# Patient Record
Sex: MALE | Race: WHITE | NOT HISPANIC OR LATINO | Employment: OTHER | URBAN - METROPOLITAN AREA
[De-identification: names, ages, dates, MRNs, and addresses within clinical notes are randomized per-mention and may not be internally consistent; named-entity substitution may affect disease eponyms.]

---

## 2020-08-08 ENCOUNTER — HOSPITAL ENCOUNTER (EMERGENCY)
Facility: CLINIC | Age: 55
Discharge: HOME | End: 2020-08-08
Attending: EMERGENCY MEDICINE | Admitting: EMERGENCY MEDICINE

## 2020-08-08 VITALS
OXYGEN SATURATION: 100 % | RESPIRATION RATE: 6 BRPM | TEMPERATURE: 98.2 F | DIASTOLIC BLOOD PRESSURE: 110 MMHG | SYSTOLIC BLOOD PRESSURE: 178 MMHG

## 2020-08-08 DIAGNOSIS — S00.512A ABRASION OF GUM, INITIAL ENCOUNTER: ICD-10-CM

## 2020-08-08 DIAGNOSIS — T18.0XXA FOREIGN BODY IN MOUTH, INITIAL ENCOUNTER: ICD-10-CM

## 2020-08-08 PROCEDURE — 99283 EMERGENCY DEPT VISIT LOW MDM: CPT | Mod: 25

## 2020-08-08 NOTE — ED PROVIDER NOTES
History     Chief Complaint:  Gum discomfort    HPI   Gm Reis is a 55 year old male who presents with gum discomfort. The patient reports that he was eating and he thinks that a fish bone may have gotten stuck in his gum. This happened 15 minutes before his arrival. He states that it started bleeding heavily and it hurts when he talks.    Allergies:  No Known Allergies    Medications:    The patient is not currently taking any prescribed medications.    Past Medical History:    Asthma  Eye problems  Social stressor  Erectile dysfunction  Acid reflux  Glaucoma    Past Surgical History:    Cyst incision and drainage  Laser eye surgery    Family History:    Father- alcohol/drug  Mother- hypertension     Social History:  PCP: Provider Not In System  Presents to the ED by himself    Review of Systems   HENT: Positive for dental problem.    All other systems reviewed and are negative.        Physical Exam     Patient Vitals for the past 24 hrs:   BP Temp Temp src Heart Rate Resp SpO2   08/08/20 1845 (!) 178/110 98.2  F (36.8  C) Oral 79 (!) 6 100 %       Physical Exam  Constitutional:       Appearance: He is well-developed.   HENT:      Right Ear: External ear normal.      Left Ear: External ear normal.      Mouth/Throat:      Pharynx: No oropharyngeal exudate or posterior oropharyngeal erythema.      Comments: Left lower gum with mild oozing. I see a small fish bone under the left base of the tongue. This was easily removable.  Eyes:      General: No scleral icterus.     Conjunctiva/sclera: Conjunctivae normal.      Pupils: Pupils are equal, round, and reactive to light.   Cardiovascular:      Rate and Rhythm: Normal rate and regular rhythm.      Heart sounds: Normal heart sounds. No murmur. No friction rub. No gallop.    Pulmonary:      Effort: Pulmonary effort is normal. No respiratory distress.      Breath sounds: Normal breath sounds. No wheezing or rales.   Neurological:      Mental Status: He is alert.        Emergency Department Course       Procedures:    PROCEDURE: Removal of foreign body from the mouth  CONSENT: Verbal  INDICATION:  Imbedded foreign body in the mouth, presumed to be a fish bone  POST-PROCEDURE DIAGNOSIS: successful removal of  foreign body from behind the tongue.   PROVIDER: Obed Vázquez MD  DESCRIPTION OF PROCEDURE:    Informed verbal consent. Site was correctly identified. The foreign body was identified in the base of the tongue and removed with a tongue depressor.  The foreign body was successfully removed.    Emergency Department Course:  Past medical records, nursing notes, and vitals reviewed.  1900: I performed an exam of the patient and obtained history, as documented above.    Findings and plan explained to the Patient. Patient discharged home with instructions regarding supportive care, medications, and reasons to return. The importance of close follow-up was reviewed.     Impression & Plan      Medical Decision Making:  Patient presents after feeling like a piece of fish bone got stuck on his gum. He could not get it removed and there was bleeding. When I examined his mouth there appears to be an object under his left tongue in between the cheek and the base of the tongue. I was able to remove that easily with a tongue depressor and the entire piece came out and he said the pain went away after that. He was asymptomatic. I reexamined his oral cavity after the removal and did not see any foreign body. He also did not feel anything else either. Patient is discharged with instruction that his abrasion on his gum will heal on its own and to be more careful next time he eats food with bone. Patient should refer back to his doctor.     Diagnosis:    ICD-10-CM   1. Foreign body in mouth, initial encounter  T18.0XXA   2. Abrasion of gum, initial encounter  S00.512A       Disposition:  Discharged to home.     Bianca Shah  8/8/2020   [facility] EMERGENCY  DEPARTMENT    I, Bianca Shah, am serving as a scribe at 7:03 PM on 8/8/2020 to document services personally performed by Kathie Clay MD based on my observations and the provider's statements to me.        Kathie Clay MD  08/08/20 0259

## 2020-08-08 NOTE — ED AVS SNAPSHOT
[facility] Emergency Department  [address]  [facility] [address]  Phone:  [phone]  Fax:  [phone]                                    Gm Reis   MRN: [id number]    Department:  [facility] Emergency Department   Date of Visit:  8/8/2020           After Visit Summary Signature Page    I have received my discharge instructions, and my questions have been answered. I have discussed any challenges I see with this plan with the nurse or doctor.    ..........................................................................................................................................  Patient/Patient Representative Signature      ..........................................................................................................................................  Patient Representative Print Name and Relationship to Patient    ..................................................               ................................................  Date                                   Time    ..........................................................................................................................................  Reviewed by Signature/Title    ...................................................              ..............................................  Date                                               Time          22EPIC Rev 08/18

## 2020-08-08 NOTE — ED TRIAGE NOTES
Fish bone stuck in left lower gum for the past 30 minutes.  Patient reports bleeding heavily when tried to remove it.

## 2020-08-09 NOTE — DISCHARGE INSTRUCTIONS
We removed the fish bone today  Your gum was scraped by the bone. The bleeding will stop on its own

## 2020-11-29 ENCOUNTER — HEALTH MAINTENANCE LETTER (OUTPATIENT)
Age: 55
End: 2020-11-29

## 2021-09-19 ENCOUNTER — HEALTH MAINTENANCE LETTER (OUTPATIENT)
Age: 56
End: 2021-09-19

## 2022-01-09 ENCOUNTER — HEALTH MAINTENANCE LETTER (OUTPATIENT)
Age: 57
End: 2022-01-09

## 2022-11-21 ENCOUNTER — HEALTH MAINTENANCE LETTER (OUTPATIENT)
Age: 57
End: 2022-11-21

## 2023-04-16 ENCOUNTER — HEALTH MAINTENANCE LETTER (OUTPATIENT)
Age: 58
End: 2023-04-16

## 2024-02-10 PROCEDURE — 99284 EMERGENCY DEPT VISIT MOD MDM: CPT

## 2024-02-10 PROCEDURE — 93005 ELECTROCARDIOGRAM TRACING: CPT

## 2024-02-11 ENCOUNTER — HOSPITAL ENCOUNTER (EMERGENCY)
Facility: CLINIC | Age: 59
Discharge: HOME | End: 2024-02-11
Attending: EMERGENCY MEDICINE | Admitting: EMERGENCY MEDICINE

## 2024-02-11 VITALS
OXYGEN SATURATION: 99 % | DIASTOLIC BLOOD PRESSURE: 86 MMHG | TEMPERATURE: 97.6 F | RESPIRATION RATE: 16 BRPM | SYSTOLIC BLOOD PRESSURE: 135 MMHG | HEART RATE: 66 BPM

## 2024-02-11 DIAGNOSIS — I10 UNCONTROLLED HYPERTENSION: ICD-10-CM

## 2024-02-11 LAB
ANION GAP SERPL CALCULATED.3IONS-SCNC: 15 MMOL/L (ref 7–15)
BASOPHILS # BLD AUTO: 0 10E3/UL (ref 0–0.2)
BASOPHILS NFR BLD AUTO: 0 %
BUN SERPL-MCNC: 14.7 MG/DL (ref 6–20)
CALCIUM SERPL-MCNC: 9.6 MG/DL (ref 8.6–10)
CHLORIDE SERPL-SCNC: 104 MMOL/L (ref 98–107)
CREAT SERPL-MCNC: 1.14 MG/DL (ref 0.67–1.17)
DEPRECATED HCO3 PLAS-SCNC: 23 MMOL/L (ref 22–29)
EGFRCR SERPLBLD CKD-EPI 2021: 75 ML/MIN/1.73M2
EOSINOPHIL # BLD AUTO: 0.2 10E3/UL (ref 0–0.7)
EOSINOPHIL NFR BLD AUTO: 3 %
ERYTHROCYTE [DISTWIDTH] IN BLOOD BY AUTOMATED COUNT: 14.2 % (ref 10–15)
GLUCOSE SERPL-MCNC: 107 MG/DL (ref 70–99)
HCT VFR BLD AUTO: 43.4 % (ref 40–53)
HGB BLD-MCNC: 14.1 G/DL (ref 13.3–17.7)
HOLD SPECIMEN: NORMAL
HOLD SPECIMEN: NORMAL
IMM GRANULOCYTES # BLD: 0 10E3/UL
IMM GRANULOCYTES NFR BLD: 0 %
LYMPHOCYTES # BLD AUTO: 2.2 10E3/UL (ref 0.8–5.3)
LYMPHOCYTES NFR BLD AUTO: 36 %
MCH RBC QN AUTO: 28.8 PG (ref 26.5–33)
MCHC RBC AUTO-ENTMCNC: 32.5 G/DL (ref 31.5–36.5)
MCV RBC AUTO: 89 FL (ref 78–100)
MONOCYTES # BLD AUTO: 0.4 10E3/UL (ref 0–1.3)
MONOCYTES NFR BLD AUTO: 7 %
NEUTROPHILS # BLD AUTO: 3.4 10E3/UL (ref 1.6–8.3)
NEUTROPHILS NFR BLD AUTO: 54 %
NRBC # BLD AUTO: 0 10E3/UL
NRBC BLD AUTO-RTO: 0 /100
PLATELET # BLD AUTO: 313 10E3/UL (ref 150–450)
POTASSIUM SERPL-SCNC: 4.1 MMOL/L (ref 3.4–5.3)
RBC # BLD AUTO: 4.89 10E6/UL (ref 4.4–5.9)
SODIUM SERPL-SCNC: 142 MMOL/L (ref 135–145)
TROPONIN T SERPL HS-MCNC: 10 NG/L
WBC # BLD AUTO: 6.3 10E3/UL (ref 4–11)

## 2024-02-11 PROCEDURE — 80048 BASIC METABOLIC PNL TOTAL CA: CPT | Performed by: EMERGENCY MEDICINE

## 2024-02-11 PROCEDURE — 36415 COLL VENOUS BLD VENIPUNCTURE: CPT | Performed by: EMERGENCY MEDICINE

## 2024-02-11 PROCEDURE — 85025 COMPLETE CBC W/AUTO DIFF WBC: CPT | Performed by: EMERGENCY MEDICINE

## 2024-02-11 PROCEDURE — 84484 ASSAY OF TROPONIN QUANT: CPT | Performed by: EMERGENCY MEDICINE

## 2024-02-11 NOTE — ED PROVIDER NOTES
History     Chief Complaint:  Hypertension       HPI   Gm Reis is a 58 year old male who presents to the ED for hypertension.  The patient is treated for diabetes with metformin.  He is not on any antihypertensives currently although he did travel in the past.  Is not sure what it was but he became lightheaded and stopped it.    Patient takes his blood pressures at home very frequently.  He had measurements today were systolic up to 180.  This was otherwise asymptomatic but without headache or dizziness.  No focal weakness or paresthesias.  No vision changes.  No chest pain.    The patient uses a beet supplement that he buys online.  He feels that this does help with his pressure.    He also feels that he is stressed recently as an [employer]  and this may be driving some of the hypertension.  He denies any other dietary changes or medication changes.      Review of External Notes:  Family practice note reviewed from September 20, 2023.  The patient was not currently on antihypertensives at that point but is treated with metformin and atorvastatin.    Medications:    Metformin  Atorvastatin      Past Medical History:    Hypertension  Diabetes  Hypercholesterol        Physical Exam   Patient Vitals for the past 24 hrs:   BP Temp Temp src Pulse Resp SpO2   02/11/24 0240 135/86 -- -- 66 -- 99 %   02/11/24 0215 126/89 -- -- -- -- 99 %   02/10/24 2256 (!) 147/98 97.6  F (36.4  C) Temporal 73 16 100 %        Physical Exam  Constitutional:       General: He is not in acute distress.     Appearance: Normal appearance. He is not toxic-appearing.   HENT:      Head: Atraumatic.      Right Ear: External ear normal.      Left Ear: External ear normal.   Eyes:      General: No scleral icterus.     Conjunctiva/sclera: Conjunctivae normal.   Cardiovascular:      Rate and Rhythm: Normal rate and regular rhythm.      Heart sounds: Normal heart sounds.   Pulmonary:      Effort: Pulmonary effort is normal. No respiratory  distress.      Breath sounds: Normal breath sounds.   Abdominal:      General: There is no distension.      Palpations: Abdomen is soft.      Tenderness: There is no abdominal tenderness.   Musculoskeletal:         General: No deformity.      Cervical back: Neck supple.      Right lower leg: No edema.      Left lower leg: No edema.   Skin:     General: Skin is warm.      Capillary Refill: Capillary refill takes less than 2 seconds.   Neurological:      General: No focal deficit present.      Mental Status: He is alert and oriented to person, place, and time.   Psychiatric:         Mood and Affect: Mood normal.         Behavior: Behavior normal.           Emergency Department Course   ECG  ECG results from 02/11/24   EKG 12-lead, tracing only     Value    Systolic Blood Pressure     Diastolic Blood Pressure     Ventricular Rate 78    Atrial Rate 78    VT Interval 176    QRS Duration 86        QTc 405    P Axis 74    R AXIS 13    T Axis 56    Interpretation ECG      Sinus rhythm with sinus arrhythmia  Moderate voltage criteria for LVH, may be normal variant  Borderline ECG  No previous ECGs available        Laboratory:  Labs Ordered and Resulted from Time of ED Arrival to Time of ED Departure   BASIC METABOLIC PANEL - Abnormal       Result Value    Sodium 142      Potassium 4.1      Chloride 104      Carbon Dioxide (CO2) 23      Anion Gap 15      Urea Nitrogen 14.7      Creatinine 1.14      GFR Estimate 75      Calcium 9.6      Glucose 107 (*)    TROPONIN T, HIGH SENSITIVITY - Normal    Troponin T, High Sensitivity 10     CBC WITH PLATELETS AND DIFFERENTIAL    WBC Count 6.3      RBC Count 4.89      Hemoglobin 14.1      Hematocrit 43.4      MCV 89      MCH 28.8      MCHC 32.5      RDW 14.2      Platelet Count 313      % Neutrophils 54      % Lymphocytes 36      % Monocytes 7      % Eosinophils 3      % Basophils 0      % Immature Granulocytes 0      NRBCs per 100 WBC 0      Absolute Neutrophils 3.4      Absolute  Lymphocytes 2.2      Absolute Monocytes 0.4      Absolute Eosinophils 0.2      Absolute Basophils 0.0      Absolute Immature Granulocytes 0.0      Absolute NRBCs 0.0          Emergency Department Course & Assessments:            Disposition:  The patient was discharged to home.     Impression & Plan      Medical Decision Making:  This patient presents to the ED with asymptomatic hypertension.  No evidence of endorgan dysfunction.  He is resting comfortably.  We discussed potentially starting hydrochlorothiazide given that he has had blood pressures up into the 180 range at home a very meticulous he reports his blood pressures.  However, the patient would prefer to hold off at this point.  He feels that his hypertension may be driven by stress from his job.  He will follow closely with his primary care clinic for further review of his medication regimen.  We discussed return precautions.        Diagnosis:    ICD-10-CM    1. Uncontrolled hypertension  I10                2/11/2024   Sanjay Lazo MD McRoberts, Sean Edward, MD  02/11/24 0704

## 2024-02-11 NOTE — ED TRIAGE NOTES
Takes BP at home daily, checked BP at home tonight and it was found to be 180/70. Patient denies shortness of breath, chest pain, headache. Does not take any medications for BP.

## 2024-02-11 NOTE — DISCHARGE INSTRUCTIONS
Follow-up with your primary care clinic this week for further evaluation of your blood pressure regimen.    Return to the ER for chest pain, headaches, or any numbness or weakness.

## 2024-02-12 LAB
ATRIAL RATE - MUSE: 78 BPM
DIASTOLIC BLOOD PRESSURE - MUSE: NORMAL MMHG
INTERPRETATION ECG - MUSE: NORMAL
P AXIS - MUSE: 74 DEGREES
PR INTERVAL - MUSE: 176 MS
QRS DURATION - MUSE: 86 MS
QT - MUSE: 356 MS
QTC - MUSE: 405 MS
R AXIS - MUSE: 13 DEGREES
SYSTOLIC BLOOD PRESSURE - MUSE: NORMAL MMHG
T AXIS - MUSE: 56 DEGREES
VENTRICULAR RATE- MUSE: 78 BPM

## 2024-06-23 ENCOUNTER — HEALTH MAINTENANCE LETTER (OUTPATIENT)
Age: 59
End: 2024-06-23

## 2025-07-12 ENCOUNTER — HEALTH MAINTENANCE LETTER (OUTPATIENT)
Age: 60
End: 2025-07-12